# Patient Record
Sex: FEMALE | Race: WHITE | NOT HISPANIC OR LATINO
[De-identification: names, ages, dates, MRNs, and addresses within clinical notes are randomized per-mention and may not be internally consistent; named-entity substitution may affect disease eponyms.]

---

## 2018-09-04 ENCOUNTER — RESULT REVIEW (OUTPATIENT)
Age: 53
End: 2018-09-04

## 2019-11-13 ENCOUNTER — RESULT REVIEW (OUTPATIENT)
Age: 54
End: 2019-11-13

## 2019-12-03 ENCOUNTER — RESULT REVIEW (OUTPATIENT)
Age: 54
End: 2019-12-03

## 2019-12-16 ENCOUNTER — RESULT REVIEW (OUTPATIENT)
Age: 54
End: 2019-12-16

## 2020-07-13 ENCOUNTER — RESULT REVIEW (OUTPATIENT)
Age: 55
End: 2020-07-13

## 2021-01-29 ENCOUNTER — RESULT REVIEW (OUTPATIENT)
Age: 56
End: 2021-01-29

## 2022-02-11 ENCOUNTER — RESULT REVIEW (OUTPATIENT)
Age: 57
End: 2022-02-11

## 2022-04-04 PROBLEM — Z00.00 ENCOUNTER FOR PREVENTIVE HEALTH EXAMINATION: Status: ACTIVE | Noted: 2022-04-04

## 2022-04-05 ENCOUNTER — APPOINTMENT (OUTPATIENT)
Dept: BREAST CENTER | Facility: CLINIC | Age: 57
End: 2022-04-05
Payer: COMMERCIAL

## 2022-04-05 VITALS — WEIGHT: 100 LBS | BODY MASS INDEX: 18.88 KG/M2 | HEIGHT: 61 IN

## 2022-04-05 VITALS — OXYGEN SATURATION: 99 % | SYSTOLIC BLOOD PRESSURE: 96 MMHG | HEART RATE: 74 BPM | DIASTOLIC BLOOD PRESSURE: 59 MMHG

## 2022-04-05 DIAGNOSIS — Z78.9 OTHER SPECIFIED HEALTH STATUS: ICD-10-CM

## 2022-04-05 DIAGNOSIS — N64.89 OTHER SPECIFIED DISORDERS OF BREAST: ICD-10-CM

## 2022-04-05 PROCEDURE — 99203 OFFICE O/P NEW LOW 30 MIN: CPT

## 2022-04-05 NOTE — HISTORY OF PRESENT ILLNESS
[FreeTextEntry1] : 56-year-old postmenopausal woman had a screening mammogram which showed a right breast mass at the 12:30 position, 5 cm from the nipple on both mammogram and ultrasound.  Patient underwent a right ultrasound-guided core biopsy which revealed a radial scar which is considered concordant.  Patient denies any breast masses and has no family history of breast cancer.  Patient has never taken hormone replacement therapy.  This was the patient's first breast biopsy.

## 2022-04-06 PROBLEM — Z78.9 KNOWN HEALTH PROBLEMS: NONE: Status: RESOLVED | Noted: 2022-04-06 | Resolved: 2022-04-06

## 2022-04-22 ENCOUNTER — APPOINTMENT (OUTPATIENT)
Dept: BREAST CENTER | Facility: HOSPITAL | Age: 57
End: 2022-04-22

## 2025-05-27 ENCOUNTER — APPOINTMENT (OUTPATIENT)
Dept: OBGYN | Facility: CLINIC | Age: 60
End: 2025-05-27
Payer: COMMERCIAL

## 2025-05-27 PROCEDURE — 76830 TRANSVAGINAL US NON-OB: CPT | Mod: 26
